# Patient Record
Sex: FEMALE | Race: BLACK OR AFRICAN AMERICAN | NOT HISPANIC OR LATINO | Employment: FULL TIME | URBAN - METROPOLITAN AREA
[De-identification: names, ages, dates, MRNs, and addresses within clinical notes are randomized per-mention and may not be internally consistent; named-entity substitution may affect disease eponyms.]

---

## 2024-01-26 ENCOUNTER — OFFICE VISIT (OUTPATIENT)
Dept: URGENT CARE | Facility: CLINIC | Age: 24
End: 2024-01-26
Payer: MEDICAID

## 2024-01-26 VITALS
WEIGHT: 207 LBS | RESPIRATION RATE: 20 BRPM | BODY MASS INDEX: 35.34 KG/M2 | TEMPERATURE: 98 F | DIASTOLIC BLOOD PRESSURE: 76 MMHG | SYSTOLIC BLOOD PRESSURE: 116 MMHG | HEART RATE: 72 BPM | HEIGHT: 64 IN | OXYGEN SATURATION: 98 %

## 2024-01-26 DIAGNOSIS — K08.89 PAIN, DENTAL: Primary | ICD-10-CM

## 2024-01-26 PROCEDURE — 99203 OFFICE O/P NEW LOW 30 MIN: CPT | Mod: S$GLB,,, | Performed by: NURSE PRACTITIONER

## 2024-01-26 RX ORDER — HYDROCODONE BITARTRATE AND ACETAMINOPHEN 5; 325 MG/1; MG/1
1 TABLET ORAL EVERY 6 HOURS PRN
Qty: 10 TABLET | Refills: 0 | Status: SHIPPED | OUTPATIENT
Start: 2024-01-26

## 2024-01-26 RX ORDER — AMOXICILLIN AND CLAVULANATE POTASSIUM 875; 125 MG/1; MG/1
1 TABLET, FILM COATED ORAL EVERY 12 HOURS
Qty: 20 TABLET | Refills: 0 | Status: SHIPPED | OUTPATIENT
Start: 2024-01-26 | End: 2024-02-05

## 2024-01-26 NOTE — PROGRESS NOTES
"Subjective:      Patient ID: CELESTINO Catherine is a 23 y.o. female.    Vitals:  height is 5' 4" (1.626 m) and weight is 93.9 kg (207 lb). Her temperature is 98.1 °F (36.7 °C). Her blood pressure is 116/76 and her pulse is 72. Her respiration is 20 and oxygen saturation is 98%.     Chief Complaint: Oral Pain (Pt States "Pain Top Rt. Side of mouth")     States that the pain is the right upper gum line above a tooth that she had previously had a root canal done last year.  Denies injury or trauma.   States has appt with dentist upcoming    Oral Pain   Episode onset: x's 2 - 3 days ago. The problem has been gradually worsening. Associated symptoms include facial pain. Pertinent negatives include no fever. She has tried nothing for the symptoms.       Constitution: Negative for chills and fever.   HENT:  Positive for dental problem. Negative for drooling and mouth sores.    Neck: Negative for painful lymph nodes.   Musculoskeletal:  Positive for pain. Negative for trauma.   Hematologic/Lymphatic: Negative for swollen lymph nodes.      Objective:     Physical Exam   Constitutional: She is oriented to person, place, and time.  Non-toxic appearance. She does not appear ill. No distress.   HENT:   Head: Normocephalic and atraumatic.   Nose: Nose normal.   Mouth/Throat: Uvula is midline. Mucous membranes are moist. No dental abscesses or uvula swelling.       Eyes: Conjunctivae are normal.   Cardiovascular: Normal rate.   Pulmonary/Chest: Effort normal. No respiratory distress.   Abdominal: Normal appearance.   Neurological: no focal deficit. She is alert and oriented to person, place, and time.   Skin: Skin is warm and dry. Capillary refill takes less than 2 seconds.   Psychiatric: Her behavior is normal. Mood normal.   Nursing note and vitals reviewed.      Assessment:     1. Pain, dental        Plan:       Pain, dental  -     amoxicillin-clavulanate 875-125mg (AUGMENTIN) 875-125 mg per tablet; Take 1 tablet by mouth every 12 " (twelve) hours. for 10 days  Dispense: 20 tablet; Refill: 0  -     HYDROcodone-acetaminophen (NORCO) 5-325 mg per tablet; Take 1 tablet by mouth every 6 (six) hours as needed for Pain.  Dispense: 10 tablet; Refill: 0                 Will go ahead and initiate treatment with antibiotic with concern of pain associated with tooth from prior root canal.  No obvious abscess however with tenderness to palpation and mild facial swelling will treat empirically with patient to follow up with dentist

## 2024-01-26 NOTE — PATIENT INSTRUCTIONS
Ibuprofen or Naprosyn over-the-counter as directed for pain.     Norco for breakthrough pain not relieved by the above.  May alternate ice and heat to the affected area for 15-20 minutes every 3-4 hours.    Augmentin twice a day for 10 days  Please take a probiotic while you are on antibiotics plus a few weeks. Examples of probiotics include brand names such as Align, Floraster and Culturelle, but generic versions are ok too. Kefir is a milk product that is also an excellent probiotic and can be taken as 1/4 cup three times a day with meals.        Keep appointment with dentist already scheduled

## 2024-08-02 ENCOUNTER — CLINICAL SUPPORT (OUTPATIENT)
Dept: URGENT CARE | Facility: CLINIC | Age: 24
End: 2024-08-02

## 2024-08-02 DIAGNOSIS — Z00.00 ROUTINE GENERAL MEDICAL EXAMINATION AT A HEALTH CARE FACILITY: Primary | ICD-10-CM

## 2024-08-03 LAB
MEV IGG SER IA-ACNC: 35.3 AU/ML
MUV IGG SER IA-ACNC: 37.8 AU/ML
RUBV IGG SERPL IA-ACNC: 1.38 INDEX
VZV IGG SER IA-ACNC: <135 INDEX